# Patient Record
Sex: MALE | Race: WHITE | NOT HISPANIC OR LATINO | Employment: FULL TIME | URBAN - METROPOLITAN AREA
[De-identification: names, ages, dates, MRNs, and addresses within clinical notes are randomized per-mention and may not be internally consistent; named-entity substitution may affect disease eponyms.]

---

## 2017-05-03 ENCOUNTER — PRE VISIT (OUTPATIENT)
Dept: ORTHOPEDICS | Facility: CLINIC | Age: 22
End: 2017-05-03

## 2017-05-03 NOTE — TELEPHONE ENCOUNTER
1.  Date/reason for appt:  8/10/17   Lumbar Disc/King's Disease    2.  Referring provider:  Self    3.  Call to patient (Yes / No - short description):  No,  indicates related records/imaging at [facility].    4.  Previous care at / records requested from:  [facility]

## 2017-05-05 NOTE — TELEPHONE ENCOUNTER
Radiology reports received from [facility]. Notified Kelli to pull images.  MR lumbar on 2/10/17

## 2017-12-07 ENCOUNTER — OFFICE VISIT (OUTPATIENT)
Dept: FAMILY MEDICINE | Facility: CLINIC | Age: 22
End: 2017-12-07
Payer: COMMERCIAL

## 2017-12-07 VITALS
SYSTOLIC BLOOD PRESSURE: 120 MMHG | HEART RATE: 64 BPM | DIASTOLIC BLOOD PRESSURE: 62 MMHG | WEIGHT: 165 LBS | HEIGHT: 71 IN | OXYGEN SATURATION: 100 % | TEMPERATURE: 98.3 F | RESPIRATION RATE: 12 BRPM | BODY MASS INDEX: 23.1 KG/M2

## 2017-12-07 DIAGNOSIS — F41.9 ANXIETY: ICD-10-CM

## 2017-12-07 DIAGNOSIS — F32.1 MODERATE MAJOR DEPRESSION (H): Primary | ICD-10-CM

## 2017-12-07 PROCEDURE — 99213 OFFICE O/P EST LOW 20 MIN: CPT | Performed by: NURSE PRACTITIONER

## 2017-12-07 RX ORDER — FLUOXETINE 10 MG/1
CAPSULE ORAL
Qty: 60 CAPSULE | Refills: 1 | Status: SHIPPED | OUTPATIENT
Start: 2017-12-07 | End: 2018-01-25

## 2017-12-07 ASSESSMENT — PATIENT HEALTH QUESTIONNAIRE - PHQ9
SUM OF ALL RESPONSES TO PHQ QUESTIONS 1-9: 22
5. POOR APPETITE OR OVEREATING: NEARLY EVERY DAY

## 2017-12-07 ASSESSMENT — ANXIETY QUESTIONNAIRES
5. BEING SO RESTLESS THAT IT IS HARD TO SIT STILL: NEARLY EVERY DAY
6. BECOMING EASILY ANNOYED OR IRRITABLE: NEARLY EVERY DAY
2. NOT BEING ABLE TO STOP OR CONTROL WORRYING: NEARLY EVERY DAY
3. WORRYING TOO MUCH ABOUT DIFFERENT THINGS: NEARLY EVERY DAY
7. FEELING AFRAID AS IF SOMETHING AWFUL MIGHT HAPPEN: SEVERAL DAYS
1. FEELING NERVOUS, ANXIOUS, OR ON EDGE: NEARLY EVERY DAY
GAD7 TOTAL SCORE: 19

## 2017-12-07 NOTE — MR AVS SNAPSHOT
After Visit Summary   12/7/2017    Srinivasan Ingram    MRN: [id number]           Patient Information     Date Of Birth          1995        Visit Information        Provider Department      12/7/2017 1:00 PM Haase, Susan Rachele, APRN [facility]        Today's Diagnoses     Depression, unspecified depression type    -  1    Anxiety          Care Instructions      Depression  Depression is one of the most common mental health problems today. It is not just a state of unhappiness or sadness. It is a true disease. The cause seems to be related to a decrease in chemicals that transmit signals in the brain. Having a family history of depression, alcoholism, or suicide increases the risk. Chronic illness, chronic pain, migraine headaches and high emotional stress also increase the risk.  Depression is something we tend to recognize in others, but may have a hard time seeing in ourselves. It can show in many physical and emotional ways:    Loss of appetite    Over-eating    Not being able to sleep    Sleeping too much    Tiredness not related to physical exertion    Restlessness or irritability    Slowness of movement or speech    Feeling depressed or withdrawn    Loss of interest in things you once enjoyed    Trouble concentrating, poor memory, trouble making decisions    Thoughts of harming or killing oneself, or thoughts that life is not worth living    Low self-esteem  The treatment for depression may include both medicine and psychotherapy. Antidepressants can reduce suffering and can improve the ability to function during the depressed period. Therapy can offer emotional support and help you understand emotional factors that may be causing the depression.  Home care    On-going care and support helps people manage this disease.  Find a healthcare provider and therapist who meet your needs. Seek help when you feel like you may be getting ill.    Be kind to yourself. Make it a  point to do things that you enjoy (gardening, walking in nature, going to a movie, etc.). Reward yourself for small successes.    Take care of your physical body. Eat a balanced diet (low in saturated fat and high in fruits and vegetables). Exercise at least 3 times a week for 30 minutes. Even mild-moderate exercise (like brisk walking) can make you feel better.    Avoid alcohol, which can make depression worse.    Take medicine as prescribed.    Tell each of your healthcare providers about all of the prescription drugs, over-the-counter medicines, vitamins, and supplements you take. Certain supplements interact with medicines and can result in dangerous side effects. Ask your pharmacist when you have questions about drug interactions.    Talk with your family and trusted friends about your feelings and thoughts. Ask them to help you recognize behavior changes early so you can get help and, if needed, medicine can be adjusted.  Follow-up care  Follow up with your healthcare provider, or as advised.  Call 911  Call 911 if you:    Have suicidal thoughts, a suicide plan, and the means to carry out the plan    Have trouble breathing    Are very confused    Feel very drowsy or have trouble awakening    Faint or lose consciousness    Have new chest pain that becomes more severe, lasts longer, or spreads into your shoulder, arm, neck, jaw or back  When to seek medical advice  Call your healthcare provider right away if any of these occur:    Feeling extreme depression, fear, anxiety, or anger toward yourself or others    Feeling out of control    Feeling that you may try to harm yourself or another    Hearing voices that others do not hear    Seeing things that others do not see    Can t sleep or eat for 3 days in a row    Friends or family express concern over your behavior and ask you to seek help  Date Last Reviewed: 9/29/2015 2000-2017 [employer]. [address] [place] [address], [place] [address]. All rights  "reserved. This information is not intended as a substitute for professional medical care. Always follow your healthcare professional's instructions.                Follow-ups after your visit        Follow-up notes from your care team     Return in about 6 weeks (around 2018).      Who to contact     If you have questions or need follow up information about today's clinic visit or your schedule please contact [facility] directly at [phone].  Normal or non-critical lab and imaging results will be communicated to you by m2fxhart, letter or phone within 4 business days after the clinic has received the results. If you do not hear from us within 7 days, please contact the clinic through m2fxhart or phone. If you have a critical or abnormal lab result, we will notify you by phone as soon as possible.  Submit refill requests through [employer] or call your pharmacy and they will forward the refill request to us. Please allow 3 business days for your refill to be completed.          Additional Information About Your Visit        m2fxharUroSens Information     [employer] lets you send messages to your doctor, view your test results, renew your prescriptions, schedule appointments and more. To sign up, go to [email or web address] . Click on \"Log in\" on the left side of the screen, which will take you to the Welcome page. Then click on \"Sign up Now\" on the right side of the page.     You will be asked to enter the access code listed below, as well as some personal information. Please follow the directions to create your username and password.     Your access code is: [email or web address]  Expires: 3/7/2018  1:28 PM     Your access code will  in 90 days. If you need help or a new code, please call your [facility] or [phone].        Care EveryWhere ID     This is your Care EveryWhere ID. This could be used by other organizations to access your [facility] medical records  [id number]        Your Vitals " "Were     Pulse Temperature Respirations Height Pulse Oximetry BMI (Body Mass Index)    64 98.3  F (36.8  C) (Oral) 12 5' 11\" (1.803 m) 100% 23.01 kg/m2       Blood Pressure from Last 3 Encounters:   12/07/17 120/62   05/09/16 120/60   05/07/16 120/76    Weight from Last 3 Encounters:   12/07/17 165 lb (74.8 kg)   05/09/16 160 lb (72.6 kg)   08/01/14 162 lb 11.2 oz (73.8 kg) (66 %)*     * Growth percentiles are based on [facility] 2-20 Years data.              We Performed the Following     DEPRESSION ACTION PLAN (DAP)          Today's Medication Changes          These changes are accurate as of: 12/7/17  1:28 PM.  If you have any questions, ask your nurse or doctor.               Start taking these medicines.        Dose/Directions    FLUoxetine 10 MG capsule   Commonly known as:  PROzac   Used for:  Anxiety, Depression, unspecified depression type   Started by:  Haase, Susan Rachele, APRN CNP        Take 10 mg every day for 1-2 weeks, after 2 weeks increase to 20 mg every day.   Quantity:  60 capsule   Refills:  1            Where to get your medicines      These medications were sent to [place] [address] [facility] [id number] [place] [address]  [address] [facility] [address]     Phone:  [phone]     FLUoxetine 10 MG capsule                Primary Care Provider Office Phone # Fax #    Susan Rachele Haase, APRN -[phone] [address] [facility] [address]        Equal Access to Services     MARISA SALAZAR AH: Hadii aad ku hadasho Soomaali, waaxda luqadaha, qaybta kaalmada adeegyada, waxay idiin hayaan adeeg kharash la'aan . So [facility] [phone].    ATENCIÓN: Si habla español, tiene a mckenzie disposición servicios gratuitos de asistencia lingüística. Llame al [phone].    We comply with applicable federal civil rights laws and [place] laws. We do not discriminate on the basis of race, color, national origin, age, disability, sex, sexual orientation, or gender identity.          "   Thank you!     Thank you for choosing [facility]  for your care. Our goal is always to provide you with excellent care. Hearing back from our patients is one way we can continue to improve our services. Please take a few minutes to complete the written survey that you may receive in the mail after your visit with us. Thank you!             Your Updated Medication List - Protect others around you: Learn how to safely use, store and throw away your medicines at www.disposemymeds.org.          This list is accurate as of: 12/7/17  1:28 PM.  Always use your most recent med list.                   Brand Name Dispense Instructions for use Diagnosis    FLUoxetine 10 MG capsule    PROzac    60 capsule    Take 10 mg every day for 1-2 weeks, after 2 weeks increase to 20 mg every day.    Anxiety, Depression, unspecified depression type

## 2017-12-07 NOTE — PROGRESS NOTES
SUBJECTIVE:   Srinivasan Ingram is a 22 year old male who presents to clinic today for the following health issues:    Abnormal Mood Symptoms      Duration: 2 months    Description:  Depression: YES  Anxiety: YES  Panic attacks: no     Accompanying signs and symptoms: see PHQ-9 and APRIL scores    History (similar episodes/previous evaluation): None    Precipitating or alleviating factors: None    Therapies tried and outcome: none    Srinivasan has been having increased problems with anxiety and depression the past few months. He reports that he has had problems with both since high school, but it has not been bad until recently that it has gotten this bad. He has never been on medication or seen anyone about it before now.  He reports that he went though a bad breakup in September which triggered the depression. He had some thoughts of harming himself, and cut his legs on October 7th. Since then he has not had the urge to harm himself again. He has started talking to a counselor. He does not think that anyone else in his family has problems with depression. Has support of his mother who brought him to the appointment.     PHQ9 score: 22 - has not recently had thoughts of harming self, denies plan  GAD7 score: 19    Drugs: Srinivasan used marijuana regularly since freshman year of high school, partly to help control anxiety. He has not used since his breakup in September.     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 He has a job and is currently living at home. He does not have any concerns about his work or living situation.    Problem list and histories reviewed & adjusted, as indicated.  Additional history: as documented    Reviewed and updated as needed this visit by clinical staff     Reviewed and updated as needed this visit by Provider       ROS:  Constitutional, cardiovascular, pulmonary and psych systems are negative, except as otherwise noted.    This document serves as a record of the services  "and decisions personally performed and made by Susan Haase, CNP. It was created on her behalf by Larry Sims, a trained medical scribe. The creation of this document is based the provider's statements to the medical scribe.  Larry Sims December 7, 2017 1:09 PM      OBJECTIVE:   /62 (BP Location: Left arm, Patient Position: Chair, Cuff Size: Adult Regular)  Pulse 64  Temp 98.3  F (36.8  C) (Oral)  Resp 12  Ht 1.803 m (5' 11\")  Wt 74.8 kg (165 lb)  SpO2 100%  BMI 23.01 kg/m2  Body mass index is 23.01 kg/(m^2).  GENERAL: healthy, alert and no distress  RESP: lungs clear to auscultation - no rales, rhonchi or wheezes  CV: regular rate and rhythm, normal S1 S2, no S3 or S4, no murmur, click or rub, no peripheral edema   PSYCH: mentation appears normal, affect normal/bright    ASSESSMENT/PLAN:   Srinivasan was seen today for depression.    Diagnoses and all orders for this visit:    Moderate major depression (H)  -     DEPRESSION ACTION PLAN (DAP)  -     FLUoxetine (PROZAC) 10 MG capsule; Take 10 mg every day for 1-2 weeks, after 2 weeks increase to 20 mg every day.  Discussed risks and benefits of medication, need to take on a daily basis, will take at least 2-4 weeks to notice decrease in depression or anxiety, if symptoms of depression worsen stop taking medication and notify the clinic.  Is aware of emergency resources.  Encouraged continued exercise and counseling.     Anxiety  -     FLUoxetine (PROZAC) 10 MG capsule; Take 10 mg every day for 1-2 weeks, after 2 weeks increase to 20 mg every day.      The information in this document, created by the medical scribe for me, accurately reflects the services I personally performed and the decisions made by me. I have reviewed and approved this document for accuracy.   Susan Haase, CNP Susan Haase, APRN [facility]"

## 2017-12-07 NOTE — PATIENT INSTRUCTIONS

## 2017-12-08 ENCOUNTER — TELEPHONE (OUTPATIENT)
Dept: FAMILY MEDICINE | Facility: CLINIC | Age: 22
End: 2017-12-08

## 2017-12-08 ASSESSMENT — ANXIETY QUESTIONNAIRES: GAD7 TOTAL SCORE: 19

## 2017-12-08 NOTE — TELEPHONE ENCOUNTER
Pt mom, Ana, is requesting a Fast acting Antidepressant to get pt through until the medication that he was prescribed take hold.  Mom stated that current antidepressant prescribed take 2-4 weeks to get into patients system.      Please call medication to [facility] [address] David Perez can be reached at [phone] when completed.

## 2017-12-08 NOTE — TELEPHONE ENCOUNTER
Called mom, KAYDEN on file, informed of below  Bhavani Tidwell RN, BSN  Message handled by Nurse Triage.

## 2017-12-08 NOTE — TELEPHONE ENCOUNTER
Can you let mom know that all antidepressants work in this manner. Please make sure mom has completed forms to get information.  Thanks,  Susan Haase, CNP

## 2018-01-25 ENCOUNTER — OFFICE VISIT (OUTPATIENT)
Dept: FAMILY MEDICINE | Facility: CLINIC | Age: 23
End: 2018-01-25
Payer: COMMERCIAL

## 2018-01-25 VITALS
HEIGHT: 71 IN | SYSTOLIC BLOOD PRESSURE: 118 MMHG | BODY MASS INDEX: 25.27 KG/M2 | WEIGHT: 180.5 LBS | OXYGEN SATURATION: 96 % | RESPIRATION RATE: 14 BRPM | TEMPERATURE: 98 F | HEART RATE: 71 BPM | DIASTOLIC BLOOD PRESSURE: 63 MMHG

## 2018-01-25 DIAGNOSIS — F41.9 ANXIETY: ICD-10-CM

## 2018-01-25 DIAGNOSIS — F32.1 MODERATE MAJOR DEPRESSION (H): Primary | ICD-10-CM

## 2018-01-25 DIAGNOSIS — G47.00 INSOMNIA, UNSPECIFIED TYPE: ICD-10-CM

## 2018-01-25 PROCEDURE — 99213 OFFICE O/P EST LOW 20 MIN: CPT | Performed by: NURSE PRACTITIONER

## 2018-01-25 RX ORDER — TRAZODONE HYDROCHLORIDE 50 MG/1
50 TABLET, FILM COATED ORAL
Qty: 30 TABLET | Refills: 5 | Status: SHIPPED | OUTPATIENT
Start: 2018-01-25 | End: 2018-07-24

## 2018-01-25 ASSESSMENT — ANXIETY QUESTIONNAIRES
GAD7 TOTAL SCORE: 4
7. FEELING AFRAID AS IF SOMETHING AWFUL MIGHT HAPPEN: NOT AT ALL
7. FEELING AFRAID AS IF SOMETHING AWFUL MIGHT HAPPEN: NOT AT ALL
5. BEING SO RESTLESS THAT IT IS HARD TO SIT STILL: SEVERAL DAYS
3. WORRYING TOO MUCH ABOUT DIFFERENT THINGS: SEVERAL DAYS
4. TROUBLE RELAXING: SEVERAL DAYS
6. BECOMING EASILY ANNOYED OR IRRITABLE: NOT AT ALL
2. NOT BEING ABLE TO STOP OR CONTROL WORRYING: NOT AT ALL
1. FEELING NERVOUS, ANXIOUS, OR ON EDGE: SEVERAL DAYS

## 2018-01-25 ASSESSMENT — PATIENT HEALTH QUESTIONNAIRE - PHQ9
SUM OF ALL RESPONSES TO PHQ QUESTIONS 1-9: 7
SUM OF ALL RESPONSES TO PHQ QUESTIONS 1-9: 7
10. IF YOU CHECKED OFF ANY PROBLEMS, HOW DIFFICULT HAVE THESE PROBLEMS MADE IT FOR YOU TO DO YOUR WORK, TAKE CARE OF THINGS AT HOME, OR GET ALONG WITH OTHER PEOPLE: SOMEWHAT DIFFICULT

## 2018-01-25 NOTE — PROGRESS NOTES
SUBJECTIVE:   Srinivasan Ingram is a 22 year old male who presents to clinic today for the following health issues:    History of Present Illness     Depression & Anxiety Follow-up:     Depression/Anxiety:  Depression & Anxiety    Status since last visit::  Improved    Other associated symptoms of depression and anxiety::  None    Significant life event::  No    Current substance use::  Alcohol and Cannabis       Today's PHQ-9         PHQ-9 Total Score:     (P) 7   PHQ-9 Q9 Suicidal ideation:   (P) Not at all   Thoughts of suicide or self harm:      Self-harm Plan:        Self-harm Action:          Safety concerns for self or others:       APRIL-7 Total Score: (P) 4    Answers for HPI/ROS submitted by the patient on 1/25/2018   If you checked off any problems, how difficult have these problems made it for you to do your work, take care of things at home, or get along with other people?: Somewhat difficult    Problem list and histories reviewed & adjusted, as indicated.  Additional history: as documented    Srinivasan reports that he has been doing well since starting the Fluoxetine. He regularly takes the medication around 3:30 in the afternoon. He drinks socially, occasionally 5-6 drinks in a night, and uses cannabis 2-3 times/month. He denies side effects including headache and nausea. Last PHQ 9 score was 22, today 7.  Last APRIL 7 score of 19, today 4.     Social: He reports that he has been struggling with school, which he attributes to starting working full time at [employer] and sleep struggles.     Sleep: He reports that he has had difficulty with waking up during the night for several years, but it has gotten worse since September. He has tried melatonin without relief, and his previous therapist suggested he try Trazodone. He is not currently seeing a therapist.       ROS:  Constitutional, HEENT, cardiovascular, pulmonary and psych systems are negative, except as otherwise noted.    This document serves as a  "record of the services and decisions personally performed and made by Susan Haase, CNP. It was created on her behalf by Larry Sims, a trained medical scribe. The creation of this document is based the provider's statements to the medical scribe.  Larry Sims January 25, 2018 4:59 PM      OBJECTIVE:     /63 (BP Location: Right arm, Patient Position: Chair, Cuff Size: Adult Large)  Pulse 71  Temp 98  F (36.7  C) (Oral)  Resp 14  Ht 1.803 m (5' 11\")  Wt 81.9 kg (180 lb 8 oz)  SpO2 96%  BMI 25.17 kg/m2  Body mass index is 25.17 kg/(m^2).  GENERAL: healthy, alert and no distress  EYES: Eyes grossly normal to inspection, PERRL and conjunctivae and sclerae normal  HENT: ear canals and TM's normal, nose and mouth without ulcers or lesions  NECK: no adenopathy, no asymmetry, masses, or scars and thyroid normal to palpation  RESP: lungs clear to auscultation - no rales, rhonchi or wheezes  CV: regular rate and rhythm, normal S1 S2, no S3 or S4, no murmur, click or rub,   PSYCH: mentation appears normal, affect normal/bright    ASSESSMENT/PLAN:   Srinivasan was seen today for recheck medication.    Diagnoses and all orders for this visit:    Moderate major depression (H) and anxiety:  PHQ 9 score of 7, APRIL 7 score of 4.  Well controlled, will continue on fluoxetine at 20 mg every day.  Discussed alcohol use, is willing to cut down on alcohol use.   -     -     FLUoxetine (PROZAC) 20 MG capsule; Take 1 capsule (20 mg) by mouth daily    Insomnia, unspecified type: discussed sleep hygiene, informed that alcohol can cause disruption of sleep and frequent awakening during the night.  Will prescribe trazodone, do not take with alcohol.   -     traZODone (DESYREL) 50 MG tablet; Take 1 tablet (50 mg) by mouth nightly as needed for sleep    Follow up in 6 months, sooner as needed.   The information in this document, created by the medical scribe for me, accurately reflects the services I personally performed and the decisions " made by me. I have reviewed and approved this document for accuracy.   Susan Haase, CNP Susan Haase, APRN CNP  [facility]

## 2018-01-25 NOTE — MR AVS SNAPSHOT
"              After Visit Summary   2018    Srinivasan Ingram    MRN: [id number]           Patient Information     Date Of Birth          1995        Visit Information        Provider Department      2018 4:45 PM Haase, Susan Rachele, APRN CNP [facility]        Today's Diagnoses     Moderate major depression (H)    -  1    Anxiety           Follow-ups after your visit        Follow-up notes from your care team     Return in about 6 months (around 2018).      Who to contact     If you have questions or need follow up information about today's clinic visit or your schedule please contact [facility] directly at [phone].  Normal or non-critical lab and imaging results will be communicated to you by MyChart, letter or phone within 4 business days after the clinic has received the results. If you do not hear from us within 7 days, please contact the clinic through MyChart or phone. If you have a critical or abnormal lab result, we will notify you by phone as soon as possible.  Submit refill requests through [employer] or call your pharmacy and they will forward the refill request to us. Please allow 3 business days for your refill to be completed.          Additional Information About Your Visit        MyChart Information     [employer] lets you send messages to your doctor, view your test results, renew your prescriptions, schedule appointments and more. To sign up, go to [email or web address] . Click on \"Log in\" on the left side of the screen, which will take you to the Welcome page. Then click on \"Sign up Now\" on the right side of the page.     You will be asked to enter the access code listed below, as well as some personal information. Please follow the directions to create your username and password.     Your access code is: [email or web address]  Expires: 3/7/2018  1:28 PM     Your access code will  in 90 days. If you need help or a new code, please call your " "[facility] or [phone].        Care EveryWhere ID     This is your Care EveryWhere ID. This could be used by other organizations to access your [facility] medical records  [id number]        Your Vitals Were     Pulse Temperature Respirations Height Pulse Oximetry BMI (Body Mass Index)    71 98  F (36.7  C) (Oral) 14 5' 11\" (1.803 m) 96% 25.17 kg/m2       Blood Pressure from Last 3 Encounters:   01/25/18 118/63   12/07/17 120/62   05/09/16 120/60    Weight from Last 3 Encounters:   01/25/18 180 lb 8 oz (81.9 kg)   12/07/17 165 lb (74.8 kg)   05/09/16 160 lb (72.6 kg)              Today, you had the following     No orders found for display         Today's Medication Changes          These changes are accurate as of 1/25/18  5:12 PM.  If you have any questions, ask your nurse or doctor.               Start taking these medicines.        Dose/Directions    traZODone 50 MG tablet   Commonly known as:  DESYREL   Used for:  Anxiety, Moderate major depression (H)   Started by:  Haase, Susan Rachele, APRN CNP        Dose:  50 mg   Take 1 tablet (50 mg) by mouth nightly as needed for sleep   Quantity:  30 tablet   Refills:  5         These medicines have changed or have updated prescriptions.        Dose/Directions    FLUoxetine 20 MG capsule   Commonly known as:  PROzac   This may have changed:    - medication strength  - how much to take  - how to take this  - when to take this   Used for:  Anxiety, Moderate major depression (H)   Changed by:  Haase, Susan Rachele, APRN CNP        Dose:  20 mg   Take 1 capsule (20 mg) by mouth daily Take 10 mg every day for 1-2 weeks, after 2 weeks increase to 20 mg every day.   Quantity:  90 capsule   Refills:  1            Where to get your medicines      These medications were sent to [facility] [address] [address] [facility] - [place], [place] - [address]  [address]   [address]  [address] [facility] [address]     Phone:  [phone]     FLUoxetine 20 MG capsule    traZODone 50 MG tablet             "    Primary Care Provider Office Phone # Fax #    Susan Rachele Haase, DEEPAK -[phone] [address] [address]  [facility] [address]        Equal Access to Services     MARISA SALAZAR : Hadii aad ku hadtawnyo Soomaali, waaxda luqadaha, qaybta kaalmada adeegyada, vale childressn jennifer zhang laFranciscoshreyas holloway. So [facility] [phone].    ATENCIÓN: Si habla español, tiene a mckenzie disposición servicios gratuitos de asistencia lingüística. Llame al [phone].    We comply with applicable federal civil rights laws and [place] laws. We do not discriminate on the basis of race, color, national origin, age, disability, sex, sexual orientation, or gender identity.            Thank you!     Thank you for choosing [facility]  for your care. Our goal is always to provide you with excellent care. Hearing back from our patients is one way we can continue to improve our services. Please take a few minutes to complete the written survey that you may receive in the mail after your visit with us. Thank you!             Your Updated Medication List - Protect others around you: Learn how to safely use, store and throw away your medicines at www.disposemymeds.org.          This list is accurate as of 1/25/18  5:12 PM.  Always use your most recent med list.                   Brand Name Dispense Instructions for use Diagnosis    FLUoxetine 20 MG capsule    PROzac    90 capsule    Take 1 capsule (20 mg) by mouth daily Take 10 mg every day for 1-2 weeks, after 2 weeks increase to 20 mg every day.    Anxiety, Moderate major depression (H)       traZODone 50 MG tablet    DESYREL    30 tablet    Take 1 tablet (50 mg) by mouth nightly as needed for sleep    Anxiety, Moderate major depression (H)

## 2018-01-25 NOTE — NURSING NOTE
"Chief Complaint   Patient presents with     Recheck Medication     Prozac       Initial /63 (BP Location: Right arm, Patient Position: Chair, Cuff Size: Adult Large)  Pulse 71  Temp 98  F (36.7  C) (Oral)  Resp 14  Ht 5' 11\" (1.803 m)  Wt 180 lb 8 oz (81.9 kg)  SpO2 96%  BMI 25.17 kg/m2 Estimated body mass index is 25.17 kg/(m^2) as calculated from the following:    Height as of this encounter: 5' 11\" (1.803 m).    Weight as of this encounter: 180 lb 8 oz (81.9 kg).  Medication Reconciliation: complete   "

## 2018-01-26 ASSESSMENT — PATIENT HEALTH QUESTIONNAIRE - PHQ9: SUM OF ALL RESPONSES TO PHQ QUESTIONS 1-9: 7

## 2018-01-26 ASSESSMENT — ANXIETY QUESTIONNAIRES: GAD7 TOTAL SCORE: 4

## 2018-02-21 DIAGNOSIS — F32.1 MODERATE MAJOR DEPRESSION (H): ICD-10-CM

## 2018-02-21 DIAGNOSIS — F41.9 ANXIETY: ICD-10-CM

## 2018-02-21 NOTE — TELEPHONE ENCOUNTER
"Requested Prescriptions   Pending Prescriptions Disp Refills     FLUoxetine (PROZAC) 10 MG capsule [Pharmacy Med Name: FLUOXETINE HCL 10 MG CAPSULE] 60 capsule 0    Last Written Prescription Date:  1/25/18  Last Fill Quantity: 90,  # refills: 1   Last Office Visit: 1/25/2018   Future Office Visit:      Sig: TAKE 1 CAPSULE EVERY DAY FOR 1-2 WEEKS, AFTER 2 WEEKS INCREASE TO 2 CAPSULES EVERY DAY.    SSRIs Protocol Failed    2/21/2018  5:27 AM       Failed - PHQ-9 score less than 5 in past 6 months    PHQ-9 SCORE 12/7/2017 1/25/2018   Total Score MyChart - 7 (Mild depression)   Total Score 22 7     APRIL-7 SCORE 12/7/2017 1/25/2018   Total Score - 4 (minimal anxiety)   Total Score 19 4            Passed - Patient is age 18 or older       Passed - Recent (6 mo) or future visit with authorizing provider's specialty    Patient had office visit in the last 6 months or has a visit in the next 30 days with authorizing provider.  See \"Patient Info\" tab in inbasket, or \"Choose Columns\" in Meds & Orders section of the refill encounter.              "

## 2018-02-22 RX ORDER — FLUOXETINE 10 MG/1
CAPSULE ORAL
Qty: 60 CAPSULE | Refills: 0
Start: 2018-02-22

## 2018-02-22 NOTE — TELEPHONE ENCOUNTER
Is not on 10 mg any longer, verified with pharmacy that he did received the 20 mg dose    Mecca Newman RN, BS  Clinical Nurse Triage.

## 2018-05-21 ENCOUNTER — TELEPHONE (OUTPATIENT)
Dept: FAMILY MEDICINE | Facility: CLINIC | Age: 23
End: 2018-05-21

## 2018-05-21 NOTE — TELEPHONE ENCOUNTER
Panel Management Review      Patient has the following on his problem list:     Depression / Dysthymia review    Measure:  Needs PHQ-9 score of 4 or less during index window.  Administer PHQ-9 and if score is 5 or more, send encounter to provider for next steps.    5 - 7 month window range: 4/7/2018 - 8/7/2018    PHQ-9 SCORE 12/7/2017 1/25/2018   Total Score MyChart - 7 (Mild depression)   Total Score 22 7       If PHQ-9 recheck is 5 or more, route to provider for next steps.    Patient is due for:  PHQ9      Composite cancer screening  Chart review shows that this patient is due/due soon for the following None  Summary:    Patient is due/failing the following:   PHQ9    Action needed:   Patient needs to do PHQ9.    Type of outreach:    routed to panel pool for outreach    Questions for provider review:    None                                                                                                                                    Connie Centeno [facility]       Chart routed to Care Team .

## 2018-05-21 NOTE — LETTER
[facility]  [id number] [place] [id number] [phone]  June 11, 2018    Srinivasan Ingram  [address] [place] [phone]    Dear Srinivasan,    I care about your health and have reviewed your health plan. I have reviewed your medical conditions, medication list, and lab results and am making recommendations based on this review, to better manage your health.    You are in particular need of attention regarding:  -Depression    I am recommending that you:  {recommendations:-Fill out the enclosed PHQ9 form and mail it back to the clinic.    Here is a list of Health Maintenance topics that are due now or due soon:  Health Maintenance Due   Topic Date Due     HIV SCREEN (SYSTEM ASSIGNED)  10/10/2013       Please call us at [phone] (or use [employer]) to address the above recommendations.     Thank you for trusting [facility] and we appreciate the opportunity to serve you.  We look forward to supporting your healthcare needs in the future.    Healthy Regards,    Susan Haase CNP

## 2018-05-30 NOTE — TELEPHONE ENCOUNTER
Type of outreach:  Spoke with female, asked her to have pt call back to [employer].  Shari Schoenberger, [facility]

## 2018-07-21 DIAGNOSIS — F41.9 ANXIETY: ICD-10-CM

## 2018-07-21 DIAGNOSIS — F32.1 MODERATE MAJOR DEPRESSION (H): ICD-10-CM

## 2018-07-21 NOTE — TELEPHONE ENCOUNTER
"Requested Prescriptions   Pending Prescriptions Disp Refills     FLUoxetine (PROZAC) 20 MG capsule [Pharmacy Med Name: FLUOXETINE HCL 20 MG CAPSULE] 90 capsule 1    Last Written Prescription Date:  01/25/2018  Last Fill Quantity: 90 capsule,  # refills: 1   Last office visit: 1/25/2018 with prescribing provider:  01/25/2018   Future Office Visit:     Sig: TAKE 1 CAPSULE (20 MG) BY MOUTH DAILY    SSRIs Protocol Failed    7/21/2018  1:36 AM       Failed - PHQ-9 score less than 5 in past 6 months    Please review last PHQ-9 score.     PHQ-9 SCORE 12/7/2017 1/25/2018   Total Score MyChart - 7 (Mild depression)   Total Score 22 7     APRIL-7 SCORE 12/7/2017 1/25/2018   Total Score - 4 (minimal anxiety)   Total Score 19 4            Passed - Patient is age 18 or older       Passed - Recent (6 mo) or future (30 days) visit within the authorizing provider's specialty    Patient had office visit in the last 6 months or has a visit in the next 30 days with authorizing provider or within the authorizing provider's specialty.  See \"Patient Info\" tab in inbasket, or \"Choose Columns\" in Meds & Orders section of the refill encounter.              William Cartagena XRT  "

## 2018-07-23 NOTE — TELEPHONE ENCOUNTER
Due for 6 month visit, see panel management update letter, one month with pharmacy message sent  Prescription approved per [facility] Refill Protocol.  Bhavani Tidwell RN, BSN

## 2018-07-24 DIAGNOSIS — G47.00 INSOMNIA, UNSPECIFIED TYPE: ICD-10-CM

## 2018-07-24 RX ORDER — TRAZODONE HYDROCHLORIDE 50 MG/1
TABLET, FILM COATED ORAL
Qty: 30 TABLET | Refills: 0 | Status: SHIPPED | OUTPATIENT
Start: 2018-07-24 | End: 2018-08-29

## 2018-07-24 NOTE — TELEPHONE ENCOUNTER
Appointment due, see recent refill message sent  Prescription approved per [facility] Refill Protocol.  Bhavani Tidwell, RN, BSN

## 2018-07-24 NOTE — TELEPHONE ENCOUNTER
"Last Written Prescription Date:  1/25/18  Last Fill Quantity: 30 tablet,  # refills: 5   Last office visit: 1/25/2018 with prescribing provider:  Haase   Future Office Visit:      Requested Prescriptions   Pending Prescriptions Disp Refills     traZODone (DESYREL) 50 MG tablet [Pharmacy Med Name: TRAZODONE 50 MG TABLET] 30 tablet 5     Sig: TAKE 1 TABLET (50 MG) BY MOUTH NIGHTLY AS NEEDED FOR SLEEP    Serotonin Modulators Passed    7/24/2018  1:49 AM       Passed - Recent (12 mo) or future (30 days) visit within the authorizing provider's specialty    Patient had office visit in the last 12 months or has a visit in the next 30 days with authorizing provider or within the authorizing provider's specialty.  See \"Patient Info\" tab in inbasket, or \"Choose Columns\" in Meds & Orders section of the refill encounter.           Passed - Patient is age 18 or older          "

## 2018-08-29 DIAGNOSIS — G47.00 INSOMNIA, UNSPECIFIED TYPE: ICD-10-CM

## 2018-08-29 RX ORDER — TRAZODONE HYDROCHLORIDE 50 MG/1
TABLET, FILM COATED ORAL
Qty: 14 TABLET | Refills: 0 | Status: SHIPPED | OUTPATIENT
Start: 2018-08-29 | End: 2019-05-14

## 2018-08-29 NOTE — TELEPHONE ENCOUNTER
See last refill note, needs visit, may have 2 weeks, no further refills until seen  Prescription approved per [facility] Refill Protocol.  Bhavani Tidwell, RN, BSN

## 2018-08-29 NOTE — TELEPHONE ENCOUNTER
"Requested Prescriptions   Pending Prescriptions Disp Refills     traZODone (DESYREL) 50 MG tablet [Pharmacy Med Name: TRAZODONE 50 MG TABLET] 30 tablet 0    Last Written Prescription Date:  7/24/18  Last Fill Quantity: 30,  # refills: 0   Last Office Visit: 1/25/2018   Future Office Visit:      Sig: TAKE 1 TABLET (50 MG) BY MOUTH NIGHTLY AS NEEDED FOR SLEEP    Serotonin Modulators Passed    8/29/2018  1:54 AM       Passed - Recent (12 mo) or future (30 days) visit within the authorizing provider's specialty    Patient had office visit in the last 12 months or has a visit in the next 30 days with authorizing provider or within the authorizing provider's specialty.  See \"Patient Info\" tab in inbasket, or \"Choose Columns\" in Meds & Orders section of the refill encounter.           Passed - Patient is age 18 or older          "

## 2018-08-31 DIAGNOSIS — F41.9 ANXIETY: ICD-10-CM

## 2018-08-31 DIAGNOSIS — F32.1 MODERATE MAJOR DEPRESSION (H): ICD-10-CM

## 2018-08-31 NOTE — TELEPHONE ENCOUNTER
"Requested Prescriptions   Pending Prescriptions Disp Refills     FLUoxetine (PROZAC) 20 MG capsule [Pharmacy Med Name: FLUOXETINE HCL 20 MG CAPSULE]    Last Written Prescription Date:  7/23/18    Last Fill Quantity: 30 capsules,  # refills: 0   Last office visit: 1/25/2018 with prescribing provider:  Haase   Future Office Visit:     30 capsule 0     Sig: TAKE 1 CAPSULE BY MOUTH EVERY DAY    SSRIs Protocol Failed    8/31/2018  2:12 AM       Failed - PHQ-9 score less than 5 in past 6 months    Please review last PHQ-9 score.       PHQ-9 SCORE 12/7/2017 1/25/2018   Total Score MyChart - 7 (Mild depression)   Total Score 22 7         APRIL-7 SCORE 12/7/2017 1/25/2018   Total Score - 4 (minimal anxiety)   Total Score 19 4            Failed - Recent (6 mo) or future (30 days) visit within the authorizing provider's specialty    Patient had office visit in the last 6 months or has a visit in the next 30 days with authorizing provider or within the authorizing provider's specialty.  See \"Patient Info\" tab in inbasket, or \"Choose Columns\" in Meds & Orders section of the refill encounter.           Passed - Patient is age 18 or older          "

## 2018-08-31 NOTE — LETTER
September 4, 2018      Srinivasan Ingram  [address] [place] [phone]        Dear Srinivasan,     We recently received a call from your pharmacy requesting a refill of your medications.    A review of your chart indicates that an appointment is required.  Please contact our office at [phone] to schedule your doctor's appointment.    We have authorized one refill (partial refill) of your medication to allow time for you to schedule your appointment.    Taking care of your health is important to us and ongoing visits with your provider are vital to your care.  We look forward to seeing you in the near future.    Sincerely,    Susan Haase, CNP/Bhavani TY

## 2018-09-04 NOTE — TELEPHONE ENCOUNTER
See other refill encounter, overdue for visit, messages sent, sent another letter    Prescription approved per [facility] Refill Protocol.  Bhavani Tidwell, RN, BSN

## 2018-10-19 ENCOUNTER — OFFICE VISIT (OUTPATIENT)
Dept: URGENT CARE | Facility: URGENT CARE | Age: 23
End: 2018-10-19
Payer: OTHER MISCELLANEOUS

## 2018-10-19 VITALS
DIASTOLIC BLOOD PRESSURE: 64 MMHG | TEMPERATURE: 96.9 F | SYSTOLIC BLOOD PRESSURE: 122 MMHG | OXYGEN SATURATION: 99 % | HEART RATE: 69 BPM

## 2018-10-19 DIAGNOSIS — S61.412A LACERATION OF LEFT HAND WITHOUT FOREIGN BODY, INITIAL ENCOUNTER: Primary | ICD-10-CM

## 2018-10-19 PROCEDURE — 12001 RPR S/N/AX/GEN/TRNK 2.5CM/<: CPT | Performed by: FAMILY MEDICINE

## 2018-10-19 NOTE — PROGRESS NOTES
SUBJECTIVE:     Chief Complaint   Patient presents with     Urgent Care     Work Comp     laceration on top left hand puling wires at 8:30 am this morning      Srinivasan Ingram is a 23 year old right-handed male who presents to the clinic with a laceration on the back of the left hand sustained today at 8:30 am.  This is a work related injury.    Mechanism of injury: Patient was feeding wires into a conduit.  The wires got stuck, and the patient's left hand had to manually push the wires.  The wires gave in, and his left hand slipped when the sharp edge of the conduit box cut the dorsal of the left hand.  .  .    Associated symptoms: Denies numbness, weakness, or loss of function  Last tetanus booster within 10 years: Last tetanus booster was received on June 1, 2017.     EXAM:   The patient appears today in alert,no apparent distress distress  VITALS: /64 (BP Location: Right arm, Patient Position: Chair, Cuff Size: Adult Regular)  Pulse 69  Temp 96.9  F (36.1  C) (Tympanic)  SpO2 99%    Size of laceration: 2 centimeters on the dorsum of the left hand.   Characteristics of the laceration: clean, flap type and superficial  Tendon function intact: yes  Sensation to light touch intact: yes  Pulses intact: not asked  Picture included in patient's chart: no    Assessment:  Laceration at the left hand    PLAN:  PROCEDURE NOTE::  Wound was locally injected with 1 cc's of Lidocaine 1% with epinephrine  Good anesthesia was obtained  Wound cleaned with HIBICLENS  Wound cleaned with betadine/saline solution  Wound cleaned with sterile water  Wound soaked  Laceration was closed using 5 5-0 nylon interrupted sutures  After care instructions:  Keep wound clean and dry for the next 24-48 hours  Sutures out in 11 days  Signs of infection discussed today  May return to work as long as wound is kept clean and dry    Adarsh Apodaca MD

## 2018-10-19 NOTE — PATIENT INSTRUCTIONS
Return in 11 days for the stitches to be removed.   Extremity Laceration: Suture or Tape (Child)  A laceration is a cut through the skin. If it is large or deep, it may need stitches or staples to close the wound so it can heal. Minor cuts may be closed with surgical tape.   X-rays may be done if something may have entered the skin through the cut, such as glass or rocks. Your child may also need a tetanus shot if he or she is not up-to-date on this vaccination.  Home care  Your child s healthcare provider may prescribe an antibiotic to help prevent infection. Follow all instructions for giving this medicine to your child. Make sure your child takes the medicine every day until it is gone or told to stop.  If your child has pain, you can give him or her pain medicine as advised by the healthcare provider. Don't give your child aspirin.  In rare cases, it can cause serious problems in children 15 years of age and younger.  Don t give your child any other medicine without asking the healthcare provider first.    General care    Follow the healthcare provider s instructions on how to care for the cut.    Wash your hands with soap and warm water before and after caring for your child's cut. This is to help prevent infection.    Leave the original bandage in place for 24 hours. Replace it if it becomes wet or dirty. After 24 hours, change it once a day or as directed.    Clean the wound daily. First, remove the bandage. Then wash the area gently with soap and warm water, or as directed by your child s healthcare provider. Use a wet cotton swab to loosen and remove any blood or crust that forms. After cleaning, apply a thin layer of antibiotic ointment, if advised. Then put on a new bandage.    Caring for sutures or staples: Clean the wound daily. First, remove the bandage. Then wash the area gently with soap and warm water, or as directed by your child s provider. Use a wet cotton swab to loosen and remove any blood or  crust that forms. After cleaning, apply a thin layer of antibiotic ointment, if advised. Then put on a new bandage.    Caring for surgical tape: Keep the area dry. If it gets wet, blot it dry with a clean towel. Surgical tape usually falls off within 7 to 10 days. If it has not fallen off after 10 days, you can take it off yourself. Put mineral oil or petroleum jelly on a cotton ball and gently rub the tape until it is removed.    Explain to your child in an age appropriate way what you are doing as you care for the wound. Let your child help when possible. For example, have him or her hand you the towel or pat the area dry.    Make sure your child does not scratch, rub, or pick at the area. A baby may need to wear scratch mittens.    Don't soak the cut in water. Have your child shower or take sponge baths instead of tub baths. Don t let your child go swimming.     If the area gets wet, gently pat it dry with a clean cloth. Replace the wet bandage with a dry one.  Follow-up care  Follow up with your child s healthcare provider. Make a follow-up appointment to have the stitches or staples removed, if directed.  Special note to parents  Healthcare providers are trained to see injuries such as this in young children as a sign of possible abuse. You may be asked questions about how your child was injured. Health care providers are required by law to ask you these questions. This is done to protect your child. Please try to be patient.  When to seek medical advice  Call the child's healthcare provider for any of the following:    Wound bleeding is not controlled by direct pressure    Signs of infection develop, including increasing pain in the wound, increasing wound redness or swelling, or pus or bad odor coming from the wound    Fever of 100.4 F (38 C) or higher, or as directed by the child's healthcare provider     Stitches or staples come apart or fall out or surgical tape falls off before 7 days    Wound edges  re-open    Wound changes colors    Numbness occurs around the wound     Decreased movement occurs around the injured area  Date Last Reviewed: 8/1/2017 2000-2017 [employer]. [address] [place] [address], [place] [address]. All rights reserved. This information is not intended as a substitute for professional medical care. Always follow your healthcare professional's instructions.

## 2018-10-19 NOTE — MR AVS SNAPSHOT
After Visit Summary   10/19/2018    Srinivasan Ingram    MRN: [id number]           Patient Information     Date Of Birth          1995        Visit Information        Provider Department      10/19/2018 9:45 AM Adarsh Apodaca MD [facility]        Today's Diagnoses     Laceration of left hand without foreign body, initial encounter    -  1      Care Instructions      Return in 11 days for the stitches to be removed.   Extremity Laceration: Suture or Tape (Child)  A laceration is a cut through the skin. If it is large or deep, it may need stitches or staples to close the wound so it can heal. Minor cuts may be closed with surgical tape.   X-rays may be done if something may have entered the skin through the cut, such as glass or rocks. Your child may also need a tetanus shot if he or she is not up-to-date on this vaccination.  Home care  Your child s healthcare provider may prescribe an antibiotic to help prevent infection. Follow all instructions for giving this medicine to your child. Make sure your child takes the medicine every day until it is gone or told to stop.  If your child has pain, you can give him or her pain medicine as advised by the healthcare provider. Don't give your child aspirin.  In rare cases, it can cause serious problems in children 15 years of age and younger.  Don t give your child any other medicine without asking the healthcare provider first.    General care    Follow the healthcare provider s instructions on how to care for the cut.    Wash your hands with soap and warm water before and after caring for your child's cut. This is to help prevent infection.    Leave the original bandage in place for 24 hours. Replace it if it becomes wet or dirty. After 24 hours, change it once a day or as directed.    Clean the wound daily. First, remove the bandage. Then wash the area gently with soap and warm water, or as directed by your child s healthcare provider. Use a  wet cotton swab to loosen and remove any blood or crust that forms. After cleaning, apply a thin layer of antibiotic ointment, if advised. Then put on a new bandage.    Caring for sutures or staples: Clean the wound daily. First, remove the bandage. Then wash the area gently with soap and warm water, or as directed by your child s provider. Use a wet cotton swab to loosen and remove any blood or crust that forms. After cleaning, apply a thin layer of antibiotic ointment, if advised. Then put on a new bandage.    Caring for surgical tape: Keep the area dry. If it gets wet, blot it dry with a clean towel. Surgical tape usually falls off within 7 to 10 days. If it has not fallen off after 10 days, you can take it off yourself. Put mineral oil or petroleum jelly on a cotton ball and gently rub the tape until it is removed.    Explain to your child in an age appropriate way what you are doing as you care for the wound. Let your child help when possible. For example, have him or her hand you the towel or pat the area dry.    Make sure your child does not scratch, rub, or pick at the area. A baby may need to wear scratch mittens.    Don't soak the cut in water. Have your child shower or take sponge baths instead of tub baths. Don t let your child go swimming.     If the area gets wet, gently pat it dry with a clean cloth. Replace the wet bandage with a dry one.  Follow-up care  Follow up with your child s healthcare provider. Make a follow-up appointment to have the stitches or staples removed, if directed.  Special note to parents  Healthcare providers are trained to see injuries such as this in young children as a sign of possible abuse. You may be asked questions about how your child was injured. Health care providers are required by law to ask you these questions. This is done to protect your child. Please try to be patient.  When to seek medical advice  Call the child's healthcare provider for any of the  "following:    Wound bleeding is not controlled by direct pressure    Signs of infection develop, including increasing pain in the wound, increasing wound redness or swelling, or pus or bad odor coming from the wound    Fever of 100.4 F (38 C) or higher, or as directed by the child's healthcare provider     Stitches or staples come apart or fall out or surgical tape falls off before 7 days    Wound edges re-open    Wound changes colors    Numbness occurs around the wound     Decreased movement occurs around the injured area  Date Last Reviewed: 8/1/2017 2000-2017 [employer]. [address] [place] [address]. All rights reserved. This information is not intended as a substitute for professional medical care. Always follow your healthcare professional's instructions.                Follow-ups after your visit        Who to contact     If you have questions or need follow up information about today's clinic visit or your schedule please contact [facility] directly at [phone].  Normal or non-critical lab and imaging results will be communicated to you by [employer], letter or phone within 4 business days after the clinic has received the results. If you do not hear from us within 7 days, please contact the clinic through [employer] or phone. If you have a critical or abnormal lab result, we will notify you by phone as soon as possible.  Submit refill requests through [employer] or call your pharmacy and they will forward the refill request to us. Please allow 3 business days for your refill to be completed.          Additional Information About Your Visit        [employer] Information     [employer] lets you send messages to your doctor, view your test results, renew your prescriptions, schedule appointments and more. To sign up, go to [email or web address] . Click on \"Log in\" on the left side of the screen, which will take you to the Welcome page. Then click on \"Sign up Now\" on the " right side of the page.     You will be asked to enter the access code listed below, as well as some personal information. Please follow the directions to create your username and password.     Your access code is: [email or web address]  Expires: 2019 10:30 AM     Your access code will  in 90 days. If you need help or a new code, please call your [place] clinic or [phone].        Care EveryWhere ID     This is your Care EveryWhere ID. This could be used by other organizations to access your [place] medical records  [id number]        Your Vitals Were     Pulse Temperature Pulse Oximetry             69 96.9  F (36.1  C) (Tympanic) 99%          Blood Pressure from Last 3 Encounters:   10/19/18 122/64   18 118/63   17 120/62    Weight from Last 3 Encounters:   18 180 lb 8 oz (81.9 kg)   17 165 lb (74.8 kg)   16 160 lb (72.6 kg)              We Performed the Following     REPAIR SUPERFICIAL, WOUND BODY < =2.5CM        Primary Care Provider Office Phone # Fax #    Linda De Jesus Haase, APRN [phone] [address] [facility] [address]        Equal Access to Services     MARISA SALAZAR : Hadii latesha ku hadasho Soomaali, waaxda luqadaha, qaybta kaalmada adeegyada, vale idiin hayshreyas waggoner . So [facility] [phone].    ATENCIÓN: Si habla español, tiene a mckenzie disposición servicios gratuitos de asistencia lingüística. Llame al [phone].    We comply with applicable federal civil rights laws and [place] laws. We do not discriminate on the basis of race, color, national origin, age, disability, sex, sexual orientation, or gender identity.            Thank you!     Thank you for choosing [facility]  for your care. Our goal is always to provide you with excellent care. Hearing back from our patients is one way we can continue to improve our services. Please take a few minutes to complete the written survey that you may receive in the  mail after your visit with us. Thank you!             Your Updated Medication List - Protect others around you: Learn how to safely use, store and throw away your medicines at www.disposemymeds.org.          This list is accurate as of 10/19/18 10:30 AM.  Always use your most recent med list.                   Brand Name Dispense Instructions for use Diagnosis    FLUoxetine 20 MG capsule    PROzac    14 capsule    TAKE 1 CAPSULE BY MOUTH EVERY DAY    Anxiety, Moderate major depression (H)       traZODone 50 MG tablet    DESYREL    14 tablet    TAKE 1 TABLET (50 MG) BY MOUTH NIGHTLY AS NEEDED FOR SLEEP    Insomnia, unspecified type

## 2018-10-19 NOTE — LETTER
"[facility]  [address]  [address]  [place] [address]  [phone]        2018    REPORT OF WORK ABILITY    PATIENT DATA  Employee Name: Srinivasan Ingram        : 1995   [id number]  Work related injury: YES  Today's date: 2018  Date of injury: 10/19/2018    PROVIDER EVALUATION: Please fill in as needed.  Please give copy to employee for employer.  1. Diagnosis: Laceration of the left hand  2. Treatment: I placed five sutures (\"stitches\") onto the wound of the left hand.   3. Medication: No prescriptions were written.   NOTE: When ordering a medication, [place] Rules require Work Comp or WC on prescriptions.  4. Return to work date: 2018, with the following work restrictions:  Keep the wound dry for the next 48 hours.    DURATION OF LIMITATIONS: from  to 2018.       RESTRICTIONS: Unlimited unless listed.  Restrictions apply to home and leisure also.  If work within restrictions is not available, the employee is totally disabled.  Provider comments: Return in 11 days to have the sutures removed.   Medical Examiner: Adarsh Apodaca MD      License or registration: [id number]    Next appointment: Return in 11 days to have the sutures removed.      CC: Employer, Managed Care Plan/Payor, Patient    "

## 2018-10-21 ENCOUNTER — OFFICE VISIT (OUTPATIENT)
Dept: URGENT CARE | Facility: URGENT CARE | Age: 23
End: 2018-10-21
Payer: COMMERCIAL

## 2018-10-21 VITALS
OXYGEN SATURATION: 99 % | WEIGHT: 174 LBS | TEMPERATURE: 98.7 F | BODY MASS INDEX: 24.27 KG/M2 | HEART RATE: 74 BPM | DIASTOLIC BLOOD PRESSURE: 64 MMHG | SYSTOLIC BLOOD PRESSURE: 108 MMHG

## 2018-10-21 DIAGNOSIS — H69.93 DYSFUNCTION OF BOTH EUSTACHIAN TUBES: Primary | ICD-10-CM

## 2018-10-21 PROCEDURE — 99213 OFFICE O/P EST LOW 20 MIN: CPT | Performed by: PHYSICIAN ASSISTANT

## 2018-10-21 NOTE — MR AVS SNAPSHOT
"              After Visit Summary   10/21/2018    Srinivasan Ingram    MRN: [id number]           Patient Information     Date Of Birth          1995        Visit Information        Provider Department      10/21/2018 6:45 PM Florence Espinal PA-C [facility]        Today's Diagnoses     Dysfunction of both eustachian tubes    -  1       Follow-ups after your visit        Who to contact     If you have questions or need follow up information about today's clinic visit or your schedule please contact [facility] directly at [phone].  Normal or non-critical lab and imaging results will be communicated to you by [employer], letter or phone within 4 business days after the clinic has received the results. If you do not hear from us within 7 days, please contact the clinic through [employer] or phone. If you have a critical or abnormal lab result, we will notify you by phone as soon as possible.  Submit refill requests through [employer] or call your pharmacy and they will forward the refill request to us. Please allow 3 business days for your refill to be completed.          Additional Information About Your Visit        MyChart Information     [employer] lets you send messages to your doctor, view your test results, renew your prescriptions, schedule appointments and more. To sign up, go to [email or web address] . Click on \"Log in\" on the left side of the screen, which will take you to the Welcome page. Then click on \"Sign up Now\" on the right side of the page.     You will be asked to enter the access code listed below, as well as some personal information. Please follow the directions to create your username and password.     Your access code is: [email or web address]  Expires: 2019 10:30 AM     Your access code will  in 90 days. If you need help or a new code, please call your [place] clinic or [phone].        Care EveryWhere ID     This is your Care EveryWhere ID. This " could be used by other organizations to access your [place] medical records  [id number]        Your Vitals Were     Pulse Temperature Pulse Oximetry BMI (Body Mass Index)          74 98.7  F (37.1  C) (Tympanic) 99% 24.27 kg/m2         Blood Pressure from Last 3 Encounters:   10/21/18 108/64   10/19/18 122/64   01/25/18 118/63    Weight from Last 3 Encounters:   10/21/18 174 lb (78.9 kg)   01/25/18 180 lb 8 oz (81.9 kg)   12/07/17 165 lb (74.8 kg)              Today, you had the following     No orders found for display       Primary Care Provider Office Phone # Fax #    Linda De Jesus Haase, APRN [phone] [address] [facility] [address]        Equal Access to Services     MARISA SALAZAR : Hadii latesha peña hadasho Soomaali, waaxda luqadaha, qaybta kaalmada adeegyada, vale rucker hayshreyas waggoner . So [facility] [phone].    ATENCIÓN: Si habla español, tiene a mckenzie disposición servicios gratuitos de asistencia lingüística. Llame al [phone].    We comply with applicable federal civil rights laws and [place] laws. We do not discriminate on the basis of race, color, national origin, age, disability, sex, sexual orientation, or gender identity.            Thank you!     Thank you for choosing [facility]  for your care. Our goal is always to provide you with excellent care. Hearing back from our patients is one way we can continue to improve our services. Please take a few minutes to complete the written survey that you may receive in the mail after your visit with us. Thank you!             Your Updated Medication List - Protect others around you: Learn how to safely use, store and throw away your medicines at www.disposemymeds.org.          This list is accurate as of 10/21/18 10:15 PM.  Always use your most recent med list.                   Brand Name Dispense Instructions for use Diagnosis    FLUoxetine 20 MG capsule    PROzac    14 capsule    TAKE 1  CAPSULE BY MOUTH EVERY DAY    Anxiety, Moderate major depression (H)       traZODone 50 MG tablet    DESYREL    14 tablet    TAKE 1 TABLET (50 MG) BY MOUTH NIGHTLY AS NEEDED FOR SLEEP    Insomnia, unspecified type

## 2018-10-22 NOTE — PROGRESS NOTES
SUBJECTIVE:  Srinivasan Ingram is a 23 year old male who presents with left ear pain for 1 month(s). Has worsened in the past 2-3 days. No known trauma, nor illnesses. Did go to a concert two weeks ago. Denies changes in hearing, any abnormal drainage or bleeding, etc.    Severity: moderate   Timing: worsening  Additional symptoms include none.    Has been using OTC drops for the last few days when pain worsened - no improvement.     Symptoms are worse when he hiccups - no pain with chewing, swallowing most of the time, etc.     History of recurrent otitis: no; did have ETT as a kid per mom. Has year-round allergies. Not taking med on regular basis    Past Medical History:   Diagnosis Date     Attention deficit      Back ache      Depression      GI symptoms      Joint pain      Current Outpatient Prescriptions   Medication Sig Dispense Refill     FLUoxetine (PROZAC) 20 MG capsule TAKE 1 CAPSULE BY MOUTH EVERY DAY (Patient not taking: Reported on 10/21/2018) 14 capsule 0     traZODone (DESYREL) 50 MG tablet TAKE 1 TABLET (50 MG) BY MOUTH NIGHTLY AS NEEDED FOR SLEEP (Patient not taking: Reported on 10/21/2018) 14 tablet 0     Social History   Substance Use Topics     Smoking status: Never Smoker     Smokeless tobacco: Never Used     Alcohol use No       ROS:   Review of systems negative except as stated above.    OBJECTIVE:  /64  Pulse 74  Temp 98.7  F (37.1  C) (Tympanic)  Wt 174 lb (78.9 kg)  SpO2 99%  BMI 24.27 kg/m2   EXAM:  GENERAL: no acute distress  HEENT: PERRL, conjunctiva clear, no TMJ dysfunction, no scalp rash/lesions suggestive shingles/nerve involvement.  The right TM is distorted light reflex     The right auditory canal is normal and without drainage, edema or erythema  The left TM is distorted light reflex  The left auditory canal is normal and without drainage, edema or erythema.   No erythema, edema or tenderness of the mastoid bone.   Oropharynx exam is normal: no lesions, erythema,  adenopathy or exudate.  NECK: supple, non-tender to palpation, no adenopathy noted  RESP: lungs clear to auscultation - no rales, rhonchi or wheezes  CV: regular rates and rhythm, normal S1 S2, no murmur noted  SKIN: no suspicious lesions or rashes   PSYCH: mentation appears normal, affect normal/bright and judgment and insight intact    ASSESSMENT:  Eustachin tube dysfunction    PLAN:  Advised regular use of OTC Flonase, decongestant, and anti-inflammatory (Ibuprofen) to help dry up fluid in the ear - declined steroid course at this time.     Watch for other symptoms such as hearing impairment, fever, abnormal drainage, etc. that would warrant additional evaluation.     The above evaluation was completed by Florence Espinal PA-C and transcribed by IVY Mclaughlin.      Pt seen in conjunction with  student, who served as a scribe for this encounter.  I independently perforned all the history and physical findings as documented in this note.  The assessment and plan reflects our joint decision-making.    Florence Espinal

## 2019-05-14 ENCOUNTER — OFFICE VISIT (OUTPATIENT)
Dept: FAMILY MEDICINE | Facility: CLINIC | Age: 24
End: 2019-05-14
Payer: COMMERCIAL

## 2019-05-14 VITALS
BODY MASS INDEX: 23.94 KG/M2 | HEART RATE: 72 BPM | SYSTOLIC BLOOD PRESSURE: 120 MMHG | TEMPERATURE: 98.4 F | RESPIRATION RATE: 14 BRPM | HEIGHT: 71 IN | OXYGEN SATURATION: 98 % | DIASTOLIC BLOOD PRESSURE: 70 MMHG | WEIGHT: 171 LBS

## 2019-05-14 DIAGNOSIS — L70.9 ACNE, UNSPECIFIED ACNE TYPE: Primary | ICD-10-CM

## 2019-05-14 DIAGNOSIS — F32.1 MODERATE MAJOR DEPRESSION (H): ICD-10-CM

## 2019-05-14 PROCEDURE — 99213 OFFICE O/P EST LOW 20 MIN: CPT | Performed by: NURSE PRACTITIONER

## 2019-05-14 RX ORDER — DOXYCYCLINE 100 MG/1
100 TABLET ORAL DAILY
Qty: 30 TABLET | Refills: 2 | Status: SHIPPED | OUTPATIENT
Start: 2019-05-14 | End: 2019-08-10

## 2019-05-14 RX ORDER — CLINDAMYCIN PHOSPHATE 10 MG/G
GEL TOPICAL DAILY
Qty: 60 G | Refills: 3 | Status: SHIPPED | OUTPATIENT
Start: 2019-05-14

## 2019-05-14 ASSESSMENT — ANXIETY QUESTIONNAIRES
7. FEELING AFRAID AS IF SOMETHING AWFUL MIGHT HAPPEN: NOT AT ALL
IF YOU CHECKED OFF ANY PROBLEMS ON THIS QUESTIONNAIRE, HOW DIFFICULT HAVE THESE PROBLEMS MADE IT FOR YOU TO DO YOUR WORK, TAKE CARE OF THINGS AT HOME, OR GET ALONG WITH OTHER PEOPLE: NOT DIFFICULT AT ALL
6. BECOMING EASILY ANNOYED OR IRRITABLE: SEVERAL DAYS
GAD7 TOTAL SCORE: 6
3. WORRYING TOO MUCH ABOUT DIFFERENT THINGS: SEVERAL DAYS
2. NOT BEING ABLE TO STOP OR CONTROL WORRYING: SEVERAL DAYS
5. BEING SO RESTLESS THAT IT IS HARD TO SIT STILL: SEVERAL DAYS
1. FEELING NERVOUS, ANXIOUS, OR ON EDGE: SEVERAL DAYS

## 2019-05-14 ASSESSMENT — PATIENT HEALTH QUESTIONNAIRE - PHQ9
SUM OF ALL RESPONSES TO PHQ QUESTIONS 1-9: 7
5. POOR APPETITE OR OVEREATING: SEVERAL DAYS

## 2019-05-14 ASSESSMENT — MIFFLIN-ST. JEOR: SCORE: 1792.78

## 2019-05-14 NOTE — PROGRESS NOTES
SUBJECTIVE:   Srinivasan Ingram is a 23 year old male who presents to clinic today for the following   health issues:      Acne      Duration: years    Description (location/character/radiation): acne on back    Intensity:  mild    Accompanying signs and symptoms: none    History (similar episodes/previous evaluation): yes    Precipitating or alleviating factors: None    Therapies tried and outcome: otc acne treatment   Finished school in 2017,  graduated from [employer]. Acne worse since working, feels this is due to being in a warm and jaskaran environment most of the time.  Acne worse on his back  On shoulders.  Currently using an OTC acne scrub, has tried various topical products without improvement in the past. Pimples on his back become very large and painful.      Depression:  PHQ 9 score of 7, APRIL 7 score of 6,well controlled off of medications.   Additional history: as documented    Reviewed  and updated as needed this visit by clinical staff         Reviewed and updated as needed this visit by Provider         Patient Active Problem List   Diagnosis     Attention deficit     GI symptoms     Multiple joint pain     Back ache     Chest pain     Anxiety     Moderate major depression (H)     Insomnia, unspecified type     Past Surgical History:   Procedure Laterality Date     ENT SURGERY       TONSILLECTOMY, ADENOIDECTOMY, COMBINED         Social History     Tobacco Use     Smoking status: Never Smoker     Smokeless tobacco: Never Used   Substance Use Topics     Alcohol use: No     Family History   Problem Relation Age of Onset     Myocardial Infarction Maternal Grandfather          No current outpatient medications on file.     No Known Allergies    ROS:  CONSTITUTIONAL: NEGATIVE for fever, chills, change in weight  INTEGUMENTARY/SKIN: see HPI    OBJECTIVE:     /70 (BP Location: Right arm, Patient Position: Chair, Cuff Size: Adult Regular)   Pulse 72   Temp 98.4  F (36.9  C) (Oral)   Resp 14   Ht  "1.803 m (5' 11\")   Wt 77.6 kg (171 lb)   SpO2 98%   BMI 23.85 kg/m    Body mass index is 23.85 kg/m .   GENERAL: healthy, alert and no distress  SKIN: comedones across nose, comedones and pustules located across the upper back and shoulders, numerous scars present.     ASSESSMENT:   See below    PLAN:   Srinivasan was seen today for derm problem.    Diagnoses and all orders for this visit:    Acne, unspecified acne type;  Use OTC acne wash twice a day, wash skin with an OTC antibacterial liquid soap, apply clindamycin once daily along with benzoyl peroxide, will also prescribe oral doxy since cystic type lesions are on the back/shoulders.    -     clindamycin (CLINDAMAX) 1 % external gel; Apply topically daily  -     benzoyl peroxide (BREVOXYL) 4 % external gel; Apply once a day  -     doxycycline monohydrate (ADOXA) 100 MG tablet; Take 1 tablet (100 mg) by mouth daily    Depression:  PHQ 9 score of 7, APRIL 7 score of 6,well controlled off of medications.     Follow up in 3 months, sooner as needed.     Susan Haase, APRN [facility]"

## 2019-05-15 ASSESSMENT — ANXIETY QUESTIONNAIRES: GAD7 TOTAL SCORE: 6

## 2019-08-10 DIAGNOSIS — L70.9 ACNE, UNSPECIFIED ACNE TYPE: ICD-10-CM

## 2019-08-10 NOTE — TELEPHONE ENCOUNTER
Requested Prescriptions   Pending Prescriptions Disp Refills     doxycycline monohydrate (ADOXA) 100 MG tablet [Pharmacy Med Name: DOXYCYCLINE MONO 100 MG TAB] 30 tablet 2     Sig: TAKE 1 TABLET BY MOUTH EVERY DAY       There is no refill protocol information for this order            Last Written Prescription Date:  5/14/19  Last Fill Quantity: 30 tablet,   # refills: 2  Last Office Visit: 5/14/2019 (Haase)  Future Office visit:       Routing refill request to provider for review/approval because:  Drug not on the [facility], [facility] or [facility] refill protocol or controlled substance

## 2019-08-11 RX ORDER — DOXYCYCLINE 100 MG/1
TABLET ORAL
Qty: 30 TABLET | Refills: 0 | Status: SHIPPED | OUTPATIENT
Start: 2019-08-11

## 2019-08-11 NOTE — TELEPHONE ENCOUNTER
Medication is being filled for 1 time refill only due to:  Patient needs to be seen because needs recheck.   Claudia Boyd RN

## 2020-07-30 ENCOUNTER — VIRTUAL VISIT (OUTPATIENT)
Dept: FAMILY MEDICINE | Facility: CLINIC | Age: 25
End: 2020-07-30
Payer: COMMERCIAL

## 2020-07-30 DIAGNOSIS — R05.9 COUGH: Primary | ICD-10-CM

## 2020-07-30 PROCEDURE — 99213 OFFICE O/P EST LOW 20 MIN: CPT | Performed by: NURSE PRACTITIONER

## 2020-07-30 NOTE — PROGRESS NOTES
"Srinivasan Ingram is a 24 year old male who is being evaluated via a billable telephone visit.      The patient has been notified of following:     \"This telephone visit will be conducted via a call between you and your physician/provider. We have found that certain health care needs can be provided without the need for a physical exam.  This service lets us provide the care you need with a short phone conversation.  If a prescription is necessary we can send it directly to your pharmacy.  If lab work is needed we can place an order for that and you can then stop by our lab to have the test done at a later time.    Telephone visits are billed at different rates depending on your insurance coverage. During this emergency period, for some insurers they may be billed the same as an in-person visit.  Please reach out to your insurance provider with any questions.    If during the course of the call the physician/provider feels a telephone visit is not appropriate, you will not be charged for this service.\"    Patient has given verbal consent for Telephone visit?  Yes    What phone number would you like to be contacted at? [phone]    How would you like to obtain your AVS? MyChart    Subjective     Srinivasan Ingram is a 24 year old male who presents via phone visit today for the following health issues:    HPI    Acute Illness   Acute illness concerns: Cough and fever  Onset: X1 day    Fever: YES- 99    Chills/Sweats: YES- Both    Headache (location?): no     Sinus Pressure:YES    Conjunctivitis:  no    Ear Pain: no    Rhinorrhea: YES    Congestion: YES    Sore Throat: YES- Tickle in throat     Cough: YES-non-productive    Wheeze: no     Decreased Appetite: YES-    Nausea: no     Vomiting: no     Diarrhea:  no     Dysuria/Freq.: YES- Increase    Fatigue/Achiness: YES- Both    Sick/Strep Exposure: no      Therapies Tried and outcome:   Has had frequent, annoying cough for 24 hours.  Works in an office setting.  Temp max " 99. Generalized body achiness.     Patient Active Problem List   Diagnosis     Anxiety     Moderate major depression (H)     Insomnia, unspecified type     Past Surgical History:   Procedure Laterality Date     ENT SURGERY       TONSILLECTOMY, ADENOIDECTOMY, COMBINED         Social History     Tobacco Use     Smoking status: Never Smoker     Smokeless tobacco: Never Used   Substance Use Topics     Alcohol use: Yes     Comment: oc     Family History   Problem Relation Age of Onset     Myocardial Infarction Maternal Grandfather          Current Outpatient Medications   Medication Sig Dispense Refill     benzoyl peroxide (BREVOXYL) 4 % external gel Apply once a day 42.5 g 11     clindamycin (CLINDAMAX) 1 % external gel Apply topically daily 60 g 3     doxycycline monohydrate (ADOXA) 100 MG tablet TAKE 1 TABLET BY MOUTH EVERY DAY 30 tablet 0     Allergies   Allergen Reactions     Molds & Smuts      Other reaction(s): Runny Nose     BP Readings from Last 3 Encounters:   05/14/19 120/70   10/21/18 108/64   10/19/18 122/64    Wt Readings from Last 3 Encounters:   05/14/19 77.6 kg (171 lb)   10/21/18 78.9 kg (174 lb)   01/25/18 81.9 kg (180 lb 8 oz)      Reviewed and updated as needed this visit by Provider         Review of Systems   CONSTITUTIONAL: NEGATIVE for fever, chills, change in weight  ENT/MOUTH: NEGATIVE for ear, mouth and throat problems  RESP: NEGATIVE for significant cough or SOB  CV: NEGATIVE for chest pain, palpitations or peripheral edema  HEME/ALLERGY/IMMUNE:   PSYCHIATRIC: NEGATIVE for changes in mood or affect       Objective   Reported vitals:  There were no vitals taken for this visit.   PSYCH: Alert and oriented times 3; coherent speech, normal   rate and volume, able to articulate logical thoughts, able   to abstract reason, no tangential thoughts, no hallucinations   or delusions  His affect is normal  RESP: Dry cough no audible wheezing, able to talk in full sentences  Remainder of exam unable to  "be completed due to telephone visits          Assessment/Plan:  Srinivasan was seen today for suspected covid.    Diagnoses and all orders for this visit:    Cough:   -     Symptomatic COVID-19 Virus (Coronavirus) by PCR; Future    Discharge Instructions for COVID-19 Patients  You have--or may have--COVID-19. Please follow the instructions listed below.   If you have a weakened immune system, discuss with your doctor any other actions you need to take.  How can I protect others?  If you have symptoms (fever, cough, body aches or trouble breathing):    Stay home and away from others (self-isolate) until:  ? At least 10 days have passed since your symptoms started. And   ? You've had no fever--and no medicine that reduces fever--for 3 full days (72 hours). And   ? Your other symptoms have resolved (gotten better).  If you don't show symptoms, but testing showed that you have COVID-19:    Stay home and away from others (self-isolate) until at least 10 days have passed since the date of your first positive COVID-19 test.  During this time    Stay in your own room, even for meals. Use your own bathroom if you can.    Stay away from others in your home. No hugging, kissing or shaking hands. No visitors.    Don't go to work, school or anywhere else.    Clean \"high touch\" surfaces often (doorknobs, counters, handles). Use household cleaning spray or wipes. You'll find a full list of  on the EPA website: www.epa.gov/pesticide-registration/list-n-disinfectants-use-against-sars-cov-2.    Cover your mouth and nose with a mask, tissue or wash cloth to avoid spreading germs.    Wash your hands and face often. Use soap and water.    Caregivers in these groups are at risk for severe illness due to COVID-19:  ? People 65 years and older  ? People who live in a nursing home or long-term care facility  ? People with chronic disease (lung, heart, cancer, diabetes, kidney, liver, immunologic)  ? People who have a weakened immune " system, including those who:    Are in cancer treatment    Take medicine that weakens the immune system, such as corticosteroids    Had a bone marrow or organ transplant    Have an immune deficiency    Have poorly controlled HIV or AIDS    Are obese (body mass index of 40 or higher)    Smoke regularly    Caregivers should wear gloves while washing dishes, handling laundry and cleaning bedrooms and bathrooms.    Use caution when washing and drying laundry: Don't shake dirty laundry and use the warmest water setting that you can.    For more tips on managing your health at home, go to www.cdc.gov/coronavirus/2019-ncov/downloads/10Things.pdf.  How can I take care of myself at home?  1. Get lots of rest. Drink extra fluids (unless a doctor has told you not to).  2. Take Tylenol (acetaminophen) for fever or pain. If you have liver or kidney problems, ask your family doctor if it's okay to take Tylenol.     Adults can take either:  ? 650 mg (two 325 mg pills) every 4 to 6 hours, or   ? 1,000 mg (two 500 mg pills) every 8 hours as needed.  ? Note: Don't take more than 3,000 mg in one day. Acetaminophen is found in many medicines (both prescribed and over-the-counter medicines). Read all labels to be sure you don't take too much.   For children, check the Tylenol bottle for the right dose. The dose is based on the child's age or weight.  3. If you have other health problems (like cancer, heart failure, an organ transplant or severe kidney disease): Call your specialty clinic if you don't feel better in the next 2 days.  4. Know when to call 911. Emergency warning signs include:  ? Trouble breathing or shortness of breath  ? Pain or pressure in the chest that doesn't go away  ? Feeling confused like you haven't felt before, or not being able to wake up  ? Bluish-colored lips or face  5. Your doctor may have prescribed a blood thinner medicine. Follow their instructions.  Where can I get more information?    [facility]  - About COVID-19:   [email or web address]    CDC - What to Do If You're Sick: www.cdc.gov/coronavirus/2019-ncov/about/steps-when-sick.html    CDC - Ending Home Isolation: www.cdc.gov/coronavirus/2019-ncov/hcp/disposition-in-home-patients.html    CDC - Caring for Someone: www.cdc.gov/coronavirus/2019-ncov/if-you-are-sick/care-for-someone.html    [facility] - Interim Guidance for Hospital Discharge to Home: www.health.[facility].[place].us/diseases/coronavirus/hcp/hospdischarge.pdf    [facility] clinical trials (COVID-19 research studies): clinicalaffairs.[facility]-clinical-trials    Below are the COVID-19 hotlines at the [place] Department of Health ([facility]). Interpreters are available.  ? For health questions: Call 760-789-1157 or 1-966.449.8178 (7 a.m. to 7 p.m.)  ? For questions about schools and childcare: Call 909-940-9854 or 1-958.984.2133 (7 a.m. to 7 p.m.)    For informational purposes only. Not to replace the advice of your health care provider. Clinically reviewed by the Infection Prevention Team.Copyright   2020 [employer]. All rights reserved. Elo7 [id number] - 06/20.          Phone call duration:  8 minutes  Susan Haase, CNP

## 2020-08-10 ENCOUNTER — COMMUNICATION - HEALTHEAST (OUTPATIENT)
Dept: SCHEDULING | Facility: CLINIC | Age: 25
End: 2020-08-10

## 2020-11-07 ENCOUNTER — HEALTH MAINTENANCE LETTER (OUTPATIENT)
Age: 25
End: 2020-11-07

## 2021-06-10 NOTE — TELEPHONE ENCOUNTER
Coronavirus (COVID-19) Notification    Lab Result   Lab test 2019-nCoV rRt-PCR OR SARS-COV-2 PCR    Nasopharyngeal AND/OR Oropharyngeal swab is NEGATIVE for 2019-nCoV RNA [OR] SARS-COV-2 RNA (COVID-19) RNA    Your result was negative. This means that we didn't find the virus that causes COVID-19 in your sample. A test may show negative when you do actually have the virus. This can happen when the virus is in the early stages of infection, before you feel illness symptoms.    If you have symptoms   Stay home and away from others (self-isolate) until you meet ALL of the guidelines below:    You've had no fever--and no medicine that reduces fever--for 3 full days (72 hours). And      Your other symptoms have gotten better. For example, your cough or breathing has improved. And     At least 10 days have passed since your symptoms started.    During this time:    Stay home. Don't go to work, school or anywhere else.     Stay in your own room, including for meals. Use your own bathroom if you can.    Stay away from others in your home. No hugging, kissing or shaking hands. No visitors.    Clean  high touch  surfaces often (doorknobs, counters, handles, etc.). Use a household cleaning spray or wipes. You can find a full list on the EPA website at www.epa.gov/pesticide-registration/list-n-disinfectants-use-against-sars-cov-2.    Cover your mouth and nose with a mask, tissue or washcloth to avoid spreading germs.    Wash your hands and face often with soap and water.    Going back to work  Check with your employer for any guidelines to follow for going back to work.  You are sent a letter for your Employer which will serve as formal document notice that you, the employee, tested negative for COVID-19, as of the testing date shown above.    If your symptoms worsen or other concerning symptoms, contact PCP, oncare or consider returning to Emergency Dept.    Where can I get more information?    [facility]:  [email or web address]    Coronavirus Basics: www.health.[facility]./diseases/coronavirus/basics.html    [facility] Hotline (557-671-7284)    {Name]  Sherrill Murillo RN [facility] Triage Nurse Advisor

## 2021-09-05 ENCOUNTER — HEALTH MAINTENANCE LETTER (OUTPATIENT)
Age: 26
End: 2021-09-05

## 2021-10-19 PROBLEM — F32.9 MAJOR DEPRESSION: Status: ACTIVE | Noted: 2017-12-07

## 2021-12-26 ENCOUNTER — HEALTH MAINTENANCE LETTER (OUTPATIENT)
Age: 26
End: 2021-12-26

## 2022-04-04 ENCOUNTER — HOSPITAL ENCOUNTER (EMERGENCY)
Facility: CLINIC | Age: 27
Discharge: HOME | End: 2022-04-04
Attending: EMERGENCY MEDICINE | Admitting: EMERGENCY MEDICINE
Payer: COMMERCIAL

## 2022-04-04 ENCOUNTER — APPOINTMENT (OUTPATIENT)
Dept: GENERAL RADIOLOGY | Facility: CLINIC | Age: 27
End: 2022-04-04
Attending: EMERGENCY MEDICINE
Payer: COMMERCIAL

## 2022-04-04 VITALS
DIASTOLIC BLOOD PRESSURE: 70 MMHG | HEART RATE: 46 BPM | WEIGHT: 195 LBS | RESPIRATION RATE: 18 BRPM | BODY MASS INDEX: 27.2 KG/M2 | OXYGEN SATURATION: 97 % | SYSTOLIC BLOOD PRESSURE: 116 MMHG | TEMPERATURE: 97.4 F

## 2022-04-04 DIAGNOSIS — R73.9 HYPERGLYCEMIA: ICD-10-CM

## 2022-04-04 DIAGNOSIS — R07.89 OTHER CHEST PAIN: ICD-10-CM

## 2022-04-04 LAB
ANION GAP SERPL CALCULATED.3IONS-SCNC: 3 MMOL/L (ref 3–14)
ATRIAL RATE - MUSE: 50 BPM
BASOPHILS # BLD AUTO: 0 10E3/UL (ref 0–0.2)
BASOPHILS NFR BLD AUTO: 1 %
BUN SERPL-MCNC: 14 MG/DL (ref 7–30)
CALCIUM SERPL-MCNC: 9.5 MG/DL (ref 8.5–10.1)
CHLORIDE BLD-SCNC: 106 MMOL/L (ref 94–109)
CO2 SERPL-SCNC: 27 MMOL/L (ref 20–32)
CREAT SERPL-MCNC: 1 MG/DL (ref 0.66–1.25)
DIASTOLIC BLOOD PRESSURE - MUSE: NORMAL MMHG
EOSINOPHIL # BLD AUTO: 0.3 10E3/UL (ref 0–0.7)
EOSINOPHIL NFR BLD AUTO: 4 %
ERYTHROCYTE [DISTWIDTH] IN BLOOD BY AUTOMATED COUNT: 12.3 % (ref 10–15)
GFR SERPL CREATININE-BSD FRML MDRD: >90 ML/MIN/1.73M2
GLUCOSE BLD-MCNC: 112 MG/DL (ref 70–99)
HCT VFR BLD AUTO: 43.3 % (ref 40–53)
HGB BLD-MCNC: 14.2 G/DL (ref 13.3–17.7)
IMM GRANULOCYTES # BLD: 0 10E3/UL
IMM GRANULOCYTES NFR BLD: 0 %
INTERPRETATION ECG - MUSE: NORMAL
LYMPHOCYTES # BLD AUTO: 2.5 10E3/UL (ref 0.8–5.3)
LYMPHOCYTES NFR BLD AUTO: 37 %
MCH RBC QN AUTO: 28 PG (ref 26.5–33)
MCHC RBC AUTO-ENTMCNC: 32.8 G/DL (ref 31.5–36.5)
MCV RBC AUTO: 85 FL (ref 78–100)
MONOCYTES # BLD AUTO: 0.5 10E3/UL (ref 0–1.3)
MONOCYTES NFR BLD AUTO: 7 %
NEUTROPHILS # BLD AUTO: 3.6 10E3/UL (ref 1.6–8.3)
NEUTROPHILS NFR BLD AUTO: 51 %
NRBC # BLD AUTO: 0 10E3/UL
NRBC BLD AUTO-RTO: 0 /100
P AXIS - MUSE: 10 DEGREES
PLATELET # BLD AUTO: 273 10E3/UL (ref 150–450)
POTASSIUM BLD-SCNC: 4.5 MMOL/L (ref 3.4–5.3)
PR INTERVAL - MUSE: 134 MS
QRS DURATION - MUSE: 100 MS
QT - MUSE: 430 MS
QTC - MUSE: 392 MS
R AXIS - MUSE: 32 DEGREES
RBC # BLD AUTO: 5.08 10E6/UL (ref 4.4–5.9)
SODIUM SERPL-SCNC: 136 MMOL/L (ref 133–144)
SYSTOLIC BLOOD PRESSURE - MUSE: NORMAL MMHG
T AXIS - MUSE: 55 DEGREES
TROPONIN I SERPL HS-MCNC: 4 NG/L
VENTRICULAR RATE- MUSE: 50 BPM
WBC # BLD AUTO: 6.9 10E3/UL (ref 4–11)

## 2022-04-04 PROCEDURE — 93005 ELECTROCARDIOGRAM TRACING: CPT

## 2022-04-04 PROCEDURE — 99285 EMERGENCY DEPT VISIT HI MDM: CPT | Mod: 25

## 2022-04-04 PROCEDURE — 36415 COLL VENOUS BLD VENIPUNCTURE: CPT | Performed by: EMERGENCY MEDICINE

## 2022-04-04 PROCEDURE — 80048 BASIC METABOLIC PNL TOTAL CA: CPT | Performed by: EMERGENCY MEDICINE

## 2022-04-04 PROCEDURE — 71046 X-RAY EXAM CHEST 2 VIEWS: CPT

## 2022-04-04 PROCEDURE — 85025 COMPLETE CBC W/AUTO DIFF WBC: CPT | Performed by: EMERGENCY MEDICINE

## 2022-04-04 PROCEDURE — 84484 ASSAY OF TROPONIN QUANT: CPT | Performed by: EMERGENCY MEDICINE

## 2022-04-04 NOTE — ED PROVIDER NOTES
History     Chief Complaint:    Chest Pain      HPI   Srinivasan Ingram is a 26 year old male who presents with left lower rib margin chest pain starting last night at 11 PM.  The patient was laying in bed when it started he says it feels more dull than sharp worse when he takes a deep breath or moves.  The patient denies travel says he does use e-cigarette but does not use any drugs.  The patient denies any abdominal pain nausea vomiting diarrhea diaphoresis says he has had episodes in the past but normally will he will go to sleep and will go away.  He said has been constant since that time.  Is currently is quite mild.  Patient denies any recent trauma falls    Review of Systems  10 point review of systems all negative except HPI    Allergies:    Molds & Smuts      Medications:      benzoyl peroxide (BREVOXYL) 4 % external gel  clindamycin (CLINDAMAX) 1 % external gel  doxycycline monohydrate (ADOXA) 100 MG tablet        Past Medical History:      Past Medical History:   Diagnosis Date     Attention deficit      Back ache      Depression      GI symptoms      Joint pain      Patient Active Problem List    Diagnosis Date Noted     Insomnia, unspecified type 01/25/2018     Priority: Medium     Anxiety 12/07/2017     Priority: Medium     Moderate major depression (H) 12/07/2017     Priority: Medium        Past Surgical History:      Past Surgical History:   Procedure Laterality Date     ENT SURGERY       TONSILLECTOMY, ADENOIDECTOMY, COMBINED         Family History:      Family History   Problem Relation Age of Onset     Myocardial Infarction Maternal Grandfather        Social History:  Uses e-cigarettes denies cocaine use    Physical Exam     Patient Vitals for the past 24 hrs:   BP Temp Pulse Resp SpO2 Weight   04/04/22 1200 116/70 -- (!) 46 -- 97 % --   04/04/22 1103 126/72 97.4  F (36.3  C) (!) 49 18 97 % 88.5 kg (195 lb)       Physical Exam  General: The patient is alert, in no respiratory distress.    HENT:  Mucous membranes moist.    Cardiovascular: Regular rate and rhythm. Good pulses in all four extremities. Normal capillary refill and skin turgor.     Respiratory: Lungs are clear. No nasal flaring. No retractions. No wheezing, no crackles.    Gastrointestinal: Abdomen soft. No guarding, no rebound. No palpable hernias.     Musculoskeletal: No gross deformity.  Left lower rib margin is tender to palpation    Skin: No rashes or petechiae.     Neurologic: The patient is alert and oriented x3. GCS 15. No testable cranial nerve deficit. Follows commands with clear and appropriate speech. Gives appropriate answers. Good strength in all extremities. No gross neurologic deficit. Gross sensation intact. Pupils are round and reactive. No meningismus.     Lymphatic: No cervical adenopathy. No lower extremity swelling.    Psychiatric: The patient is non-tearful.    Emergency Department Course   ECG:  Normal sinus rhythm no pathologic ST ovation ventricular rate of 50   and a QTC of 394    Imaging:  XR Chest 2 Views   Final Result   IMPRESSION: PA and lateral views of the chest were obtained.   Cardiomediastinal silhouette is within normal limits. No suspicious   focal pulmonary opacities. No significant pleural effusion or   pneumothorax.      NORBERTO TELLEZ MD            SYSTEM ID:  [id number]          Laboratory:  Labs Ordered and Resulted from Time of ED Arrival to Time of ED Departure   BASIC METABOLIC PANEL - Abnormal       Result Value    Sodium 136      Potassium 4.5      Chloride 106      Carbon Dioxide (CO2) 27      Anion Gap 3      Urea Nitrogen 14      Creatinine 1.00      Calcium 9.5      Glucose 112 (*)     GFR Estimate >90     TROPONIN I - Normal    Troponin I High Sensitivity 4     CBC WITH PLATELETS AND DIFFERENTIAL    WBC Count 6.9      RBC Count 5.08      Hemoglobin 14.2      Hematocrit 43.3      MCV 85      MCH 28.0      MCHC 32.8      RDW 12.3      Platelet Count 273      % Neutrophils 51       % Lymphocytes 37      % Monocytes 7      % Eosinophils 4      % Basophils 1      % Immature Granulocytes 0      NRBCs per 100 WBC 0      Absolute Neutrophils 3.6      Absolute Lymphocytes 2.5      Absolute Monocytes 0.5      Absolute Eosinophils 0.3      Absolute Basophils 0.0      Absolute Immature Granulocytes 0.0      Absolute NRBCs 0.0         Procedures:      Emergency Department Course:           Reviewed:    I reviewed nursing notes, vitals and past history    Assessments:   I obtained history and examined the patient as noted above.    I rechecked the patient and explained findings.       Consults:            Interventions:    Medications - No data to display    Disposition:  The patient was discharged to home.    Impression & Plan        Medical Decision Making:  Patient has a low pretest probability for ACS.  I question about symptoms suggestive of PE but is not pleuritic is not traveled.  The patient does use tobacco but denies drug use.  His EKG shows bradycardia with person is young and healthy is not unexpected.  I did consider pneumonia pneumothorax.  He does not have lots of cold symptoms.  There is not signs of abdominal tenderness to suggest pancreatitis.  I think that dissection and PE are lower on the differential he did feel comfortable holding off in the D-dimer.  The patient denies any pain medication.  Even with me having sitting the patient up here he had some discomfort pointing much more towards a chest wall type cause.  The patient has had episodes in the past which would make an acute condition less likely though this episode is more severe.  This is most likely chest wall pain I felt that he is appropriate for outpatient follow-up I discussed recheck of his blood sugar and he was discharged in good condition          Diagnosis:    ICD-10-CM    1. Other chest pain  R07.89    2. Hyperglycemia  R73.9        Discharge Medications:  Discharge Medication List as of 4/4/2022 12:24 PM                Alistair Crawford MD  04/04/22 1806

## 2022-04-04 NOTE — ED TRIAGE NOTES
Pt reports that he had sudden left sided anterior CP that started last night, pt reports that the pain is intermittent and worsens with exertion and deep breathing. PT VSS and ABC's intact, no blood thinners per pt, and atruamatic

## 2022-09-10 ENCOUNTER — HOSPITAL ENCOUNTER (EMERGENCY)
Facility: CLINIC | Age: 27
Discharge: HOME | End: 2022-09-10
Attending: PHYSICIAN ASSISTANT | Admitting: PHYSICIAN ASSISTANT
Payer: COMMERCIAL

## 2022-09-10 VITALS
HEART RATE: 96 BPM | OXYGEN SATURATION: 97 % | SYSTOLIC BLOOD PRESSURE: 138 MMHG | TEMPERATURE: 98.3 F | RESPIRATION RATE: 18 BRPM | DIASTOLIC BLOOD PRESSURE: 78 MMHG

## 2022-09-10 DIAGNOSIS — H92.01 OTALGIA, RIGHT: ICD-10-CM

## 2022-09-10 PROCEDURE — 99282 EMERGENCY DEPT VISIT SF MDM: CPT

## 2022-09-10 ASSESSMENT — ENCOUNTER SYMPTOMS
EYE PAIN: 1
EYE DISCHARGE: 1

## 2022-09-10 ASSESSMENT — ACTIVITIES OF DAILY LIVING (ADL): ADLS_ACUITY_SCORE: 35

## 2022-09-10 NOTE — ED TRIAGE NOTES
"[place] and had \"excriucating sharp right ear pain\" after he did so.   Reports that hearing is decreased. States when he was younger he popped an eardrum as well.     "
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
## 2022-09-11 NOTE — DISCHARGE INSTRUCTIONS
Srinivasan, you either injured your ear drum or auditory bones  Ibuprofen and Tylenol for pain.  See ENT this week  Return with fevers, worsening pain, or any other concerns.

## 2022-09-12 ENCOUNTER — TELEPHONE (OUTPATIENT)
Dept: OTOLARYNGOLOGY | Facility: CLINIC | Age: 27
End: 2022-09-12

## 2022-09-12 NOTE — TELEPHONE ENCOUNTER
[facility] Call Center    Phone Message    May a detailed message be left on voicemail: yes     Reason for Call: Appointment Intake    Referring Provider Name: Fei Salas, NAN  Diagnosis and/or Symptoms: Otitis Media (     - Referral is stating 3-5 days , wants to be seen at [facility] did not want to schedule next avail on 10/18 and be put on waitlist and wait to see if we can accommodate  . Per direction from my lead  I am still to send TE     Please advise and reach out to pt with scheduling options . Thank you     Action Taken: Other: [facility] ent    Travel Screening: Not Applicable

## 2022-10-23 ENCOUNTER — HEALTH MAINTENANCE LETTER (OUTPATIENT)
Age: 27
End: 2022-10-23

## 2023-04-02 ENCOUNTER — HEALTH MAINTENANCE LETTER (OUTPATIENT)
Age: 28
End: 2023-04-02

## 2024-06-08 ENCOUNTER — HEALTH MAINTENANCE LETTER (OUTPATIENT)
Age: 29
End: 2024-06-08

## 2025-06-15 ENCOUNTER — HEALTH MAINTENANCE LETTER (OUTPATIENT)
Age: 30
End: 2025-06-15